# Patient Record
Sex: MALE | Race: OTHER | Employment: FULL TIME
[De-identification: names, ages, dates, MRNs, and addresses within clinical notes are randomized per-mention and may not be internally consistent; named-entity substitution may affect disease eponyms.]

---

## 2017-01-20 ENCOUNTER — TELEPHONE (OUTPATIENT)
Facility: CLINIC | Age: 22
End: 2017-01-20

## 2017-01-20 RX ORDER — AMOXICILLIN 500 MG/1
500 CAPSULE ORAL 3 TIMES DAILY
Qty: 30 CAPSULE | Refills: 0 | Status: SHIPPED | OUTPATIENT
Start: 2017-01-20 | End: 2017-01-30

## 2017-08-12 ENCOUNTER — HOSPITAL ENCOUNTER (OUTPATIENT)
Age: 22
Discharge: HOME | End: 2017-08-12
Payer: COMMERCIAL

## 2017-08-12 DIAGNOSIS — Z72.51 UNPROTECTED SEX: ICD-10-CM

## 2017-08-12 DIAGNOSIS — Z11.3 SCREEN FOR STD (SEXUALLY TRANSMITTED DISEASE): ICD-10-CM

## 2017-08-12 LAB
HIV AG/AB: NONREACTIVE
T. PALLIDUM, IGG: NONREACTIVE

## 2017-08-12 PROCEDURE — 87389 HIV-1 AG W/HIV-1&-2 AB AG IA: CPT

## 2017-08-12 PROCEDURE — 86696 HERPES SIMPLEX TYPE 2 TEST: CPT

## 2017-08-12 PROCEDURE — 86780 TREPONEMA PALLIDUM: CPT

## 2017-08-12 PROCEDURE — 87491 CHLMYD TRACH DNA AMP PROBE: CPT

## 2017-08-12 PROCEDURE — 36415 COLL VENOUS BLD VENIPUNCTURE: CPT

## 2017-08-12 PROCEDURE — 87086 URINE CULTURE/COLONY COUNT: CPT

## 2017-08-12 PROCEDURE — 87591 N.GONORRHOEAE DNA AMP PROB: CPT

## 2017-08-12 PROCEDURE — 86695 HERPES SIMPLEX TYPE 1 TEST: CPT

## 2017-08-13 LAB
CULTURE: NO GROWTH
CULTURE: NORMAL
Lab: NORMAL
SPECIMEN DESCRIPTION: NORMAL
SPECIMEN DESCRIPTION: NORMAL
STATUS: NORMAL

## 2017-08-14 LAB
C. TRACHOMATIS DNA ,URINE: NEGATIVE
HERPES SIMPLEX VIRUS 1 IGG: 0.93
HERPES SIMPLEX VIRUS 2 IGG: 0.03
N. GONORRHOEAE DNA, URINE: NEGATIVE

## 2017-11-29 ENCOUNTER — HOSPITAL ENCOUNTER (OUTPATIENT)
Age: 22
Setting detail: SPECIMEN
Discharge: HOME | End: 2017-11-29
Payer: COMMERCIAL

## 2017-11-29 DIAGNOSIS — Z20.2 POSSIBLE EXPOSURE TO STD: ICD-10-CM

## 2017-11-29 DIAGNOSIS — N50.89 GENITAL LESION, MALE: ICD-10-CM

## 2017-11-30 LAB
C. TRACHOMATIS DNA ,URINE: NEGATIVE
HERPES SIMPLEX VIRUS 1 IGG: 0.69
HERPES SIMPLEX VIRUS 2 IGG: 0.1
HERPES TYPE 1/2 IGM COMBINED: 0.68
HIV AG/AB: NONREACTIVE
N. GONORRHOEAE DNA, URINE: NEGATIVE
T. PALLIDUM, IGG: NONREACTIVE

## 2018-05-08 ENCOUNTER — HOSPITAL ENCOUNTER (OUTPATIENT)
Age: 23
Setting detail: SPECIMEN
Discharge: HOME | End: 2018-05-08
Payer: COMMERCIAL

## 2018-05-08 DIAGNOSIS — Z20.2 EXPOSURE TO SEXUALLY TRANSMITTED DISEASE (STD): ICD-10-CM

## 2018-05-08 LAB
HIV AG/AB: NONREACTIVE
T. PALLIDUM, IGG: NONREACTIVE

## 2018-05-10 LAB — HERPES TYPE 1/2 IGM COMBINED: 1.24

## 2019-05-24 PROBLEM — J45.20 MILD INTERMITTENT ASTHMA: Status: ACTIVE | Noted: 2019-05-24

## 2019-07-08 ENCOUNTER — HOSPITAL ENCOUNTER (OUTPATIENT)
Dept: PHYSICAL THERAPY | Age: 24
Setting detail: THERAPIES SERIES
Discharge: HOME | End: 2019-07-08
Payer: COMMERCIAL

## 2019-07-08 PROCEDURE — 97161 PT EVAL LOW COMPLEX 20 MIN: CPT

## 2019-07-08 PROCEDURE — 97140 MANUAL THERAPY 1/> REGIONS: CPT

## 2019-07-08 PROCEDURE — 97110 THERAPEUTIC EXERCISES: CPT

## 2019-07-08 NOTE — CONSULTS
worse 8/10   Pain at best 0/10   Description of pain Constant, sharp, shooting, aching, numb   Altered Sensation Numbness into R hand   What makes it worse Sleeping, physical activity   What makes it better Nothing   Symptom progression Gotten better   Sleep Affected           Objective:      STRENGTH  STRENGTH    Left Right  Left Right   C5 Shld Abd   L1-2 Hip Flex     Shld Flexion   Hip Abd     Shld IR   L3-4 Knee Ext     Shld ER   L4 Ankle DF     C6 Elb Flex   L5 EHL     C7 Elb Ext   S1 Plant. Flex     C8 EPL   Abdominals     T1 Fing Abd   Erector Spinae        PPT from 90 to=                                                                 Cervical ROM Left Right   Flexion Limited 50%     Extension      Rotation      Sidebend      Retraction      Pt with pain with flexion and extension      TESTS (+/-) LEFT RIGHT Not Tested   SLR supine   []   Hamstring (SLR)   []   SKTC   []   DKTC   []   Slump/Dural   []   SI JT   []   VIOLETTE   []   Joint Mobility   []   Cerv. Comp   []   Cerv. Distraction   []   Cerv.  Alar/Transverse   []   Vertebral Artery   []   Adsons   []   Charlet Stai - - []     OBSERVATION No Deficit Deficit Not Tested Comments   Posture       Forward Head [] [x] []    Rounded Shoulders [] [x] []    Kyphosis [] [] []    Lordosis [] [] []    Lateral Shift [] [] []    Scoliosis [] [] []    Iliac Crest [] [] []    PSIS [] [] []    ASIS [] [] []    Genu Valgus [] [] []    Genu Varus [] [] []    Genu Recurvatum [] [] []    Pronation [] [] []    Supination [] [] []    Leg Length Discrp [] [] []    Slumped sitting [] [x] []    Palpation [] [x] [] Pt with tightness in R UT but no tenderness to palpation   Sensation [x] [] [] Pt reports numbness in R hand, but no disturbance noted during testing   Edema [x] [] []    Neurological [x] [] []      Somatic Dysfunctions Normal Deficit Details   Cervical   [] [x] FRSR- C4-6   Thoracic   [] [x] FRSR T3-6   Rib   [] [x] R 1st rib elevated  R posterior ribs 3-6   Pelvis   [] []    Lumbar [] []    SI   [] []      Flexibility Normal Left tight Right tight   UT []    []    [x]      Scalenes []    []    []      L. Scap []    []    [x]      Pec Minor []    []    []          FUNCTION Normal Difficult Unable   Sitting [] [] []   Standing [] [] []   Ambulation [] [] []   Groom/Dress [] [] []   Lift/Carry [] [] []   Stairs [] [] []   Bending [] [] []   OH reach [] [] []   Sit to Stand [] [] []         Assessment:     STG: (to be met in 10 treatments)  1. ? Pain: Pt to decrease pain levels to 3/10 with all activities to ease pt's ability to complete ADLs  2. ? ROM: Increase flexibility of R UT to help decrease pain and improve pain free cervical motion   3. ? Strength: Pt will improve scapular stabilizer strength to help improve pt's posture and alingment  4. Independent with Home Exercise Programs    LTG: (to be met in 20 treatments)  1. Pt will score 0% impaired on NDI to allow pt to complete work tasks and return to [facility] without restrictions. 2. Decrease pain levels to 0/10 to allow pt to complete work tasks with no restrictions. Patient goals: \"to be painless\"    Rehab Potential:  [x] Good  [] Fair  [] Poor   Suggested Professional Referral:  [x] No  [] Yes:  Barriers to Goal Achievement[de-identified]  [x] No  [] Yes:  Domestic Concerns:  [x] No  [] Yes:    Pt. Education:  [x] Plans/Goals, Risks/Benefits discussed  [x] Home exercise program  Method of Education: [x] Verbal  [x] Demo  [x] Written  Comprehension of Education:  [x] Verbalizes understanding. [x] Demonstrates understanding. [x] Needs Review. [] Demonstrates/verbalizes understanding of HEP/Ed previously given.     Treatment Plan:  [x] Therapeutic Exercise   [] Electrical Stimulation  [x] Manual Therapy     [] Lumbar/Cervical Traction  [x] Neuromuscular Re-education [x] Cold/hotpack [] Iontophoresis: 4 mg/mL  [x] Instruction in HEP             Dexamethasone Sodium  [] Gait Training           Phosphate 40-80 mAmin  [] Vasocompression: Yousif    [] Other:    []  Medication allergies reviewed for use of    Dexamethasone Sodium Phosphate 4mg/ml     with iontophoresis treatments. Pt is not allergic. Frequency:  2 x/week for 20 visits      Todays Treatment:    Exercises:  Exercise  Neck Reps/ Time Weight/ Level Comments   Levator S 3x30\"     UT S 3x30\"     Scap Squeezes 20x 5\"           Prone:      Scap pinches      I's      Y's      T's      Ext      Rows                                          Other: Myofascial release to R UT [facility]       Somatic Dysfunctions Normal Deficit Details   Cervical   [] [x] FRSR- C4-6- MET   Thoracic   [] [x] FRSR T3-6- MOB   Rib   [] [x] R 1st rib elevated- MET  R posterior ribs 3-6- MOB   Pelvis   [] []    Lumbar [] []    SI   [] []          Specific Instructions for next treatment: Continue tx per POC, Administer NDI    Evaluation Complexity:  History (Personal factors, comorbidities) [x] 0 [] 1-2 [] 3+   Exam (limitations, restrictions) [x] 1-2 [] 3 [] 4+   Clinical presentation (progression) [x] Stable [] Evolving  [] Unstable   Decision Making [x] Low [] Moderate [] High    [x] Low Complexity [] Moderate Complexity [] High Complexity       Treatment Charges: Mins Units   [x] Evaluation       [x]  Low       []  Moderate       []  High 30 1   []  Modalities     [x]  Ther Exercise 10 1   [x]  Manual Therapy 20 1   []  Ther Activities     []  Aquatics     []  Vasocompression     []  Other       TOTAL TREATMENT TIME: 60    Time in: 8905      Time out: [address].    Electronically signed by: Alberta Briseno PT    Physician Signature:________________________________Date:__________________  By signing above or cosigning this note, I have reviewed this plan of care and certify a need for medically necessary rehabilitation services.      *PLEASE SIGN ABOVE AND FAX BACK ALL PAGES*

## 2019-07-11 ENCOUNTER — HOSPITAL ENCOUNTER (OUTPATIENT)
Dept: PHYSICAL THERAPY | Age: 24
Setting detail: THERAPIES SERIES
Discharge: HOME | End: 2019-07-11
Payer: COMMERCIAL

## 2019-07-11 PROCEDURE — 97110 THERAPEUTIC EXERCISES: CPT

## 2019-07-11 PROCEDURE — 97140 MANUAL THERAPY 1/> REGIONS: CPT

## 2019-07-11 NOTE — FLOWSHEET NOTE
Other     Total Treatment time 45 3       Assessment: [x] Progressing toward goals. [] No change. [x] Other: Pt states that he has relief from nec pain following manual interventions on this date, giving him better pain-free ROM. Added scapular stabilizer exercises on this date with good tolerance from pt. Pt required verbal, tactile, and demonstrations to properly complete prone exercises, often trying to compensate with UTs for completion. STG: (to be met in 10 treatments)  1. ? Pain: Pt to decrease pain levels to 3/10 with all activities to ease pt's ability to complete ADLs  2. ? ROM: Increase flexibility of R UT to help decrease pain and improve pain free cervical motion   3. ? Strength: Pt will improve scapular stabilizer strength to help improve pt's posture and alingment  4. Independent with Home Exercise Programs     LTG: (to be met in 20 treatments)  1. Pt will score 0% impaired on NDI to allow pt to complete work tasks and return to [facility] without restrictions. 2. Decrease pain levels to 0/10 to allow pt to complete work tasks with no restrictions.     Patient goals: \"to be painless\"        Pt. Education:  [x] Yes  [] No  [x] Reviewed Prior HEP/Ed  Method of Education: [x] Verbal  [x] Demo  [] Written  Comprehension of Education:  [x] Verbalizes understanding. [x] Demonstrates understanding. [x] Needs review. [] Demonstrates/verbalizes HEP/Ed previously given. Plan: [x] Continue per plan of care. [] Other:      Time In: 5375            Time Out: 1390    Electronically signed by:   Elvira Bhatia, PT

## 2019-07-15 ENCOUNTER — HOSPITAL ENCOUNTER (OUTPATIENT)
Dept: PHYSICAL THERAPY | Age: 24
Setting detail: THERAPIES SERIES
Discharge: HOME | End: 2019-07-15
Payer: COMMERCIAL

## 2019-07-15 PROCEDURE — 97140 MANUAL THERAPY 1/> REGIONS: CPT

## 2019-07-15 PROCEDURE — 97110 THERAPEUTIC EXERCISES: CPT

## 2019-07-15 NOTE — FLOWSHEET NOTE
Added prone scapular stabilization exercises and increased reps on prone exercises done last session, Added t-band exercises with no pain, but pt required verbal and tactile cues for proper technique. No increased pain following session. STG: (to be met in 10 treatments)  1. ? Pain: Pt to decrease pain levels to 3/10 with all activities to ease pt's ability to complete ADLs  2. ? ROM: Increase flexibility of R UT to help decrease pain and improve pain free cervical motion   3. ? Strength: Pt will improve scapular stabilizer strength to help improve pt's posture and alingment  4. Independent with Home Exercise Programs     LTG: (to be met in 20 treatments)  1. Pt will score 0% impaired on NDI to allow pt to complete work tasks and return to [facility] without restrictions. 2. Decrease pain levels to 0/10 to allow pt to complete work tasks with no restrictions.     Patient goals: \"to be painless\"        Pt. Education:  [x] Yes  [] No  [x] Reviewed Prior HEP/Ed  Method of Education: [x] Verbal  [x] Demo  [] Written  Comprehension of Education:  [x] Verbalizes understanding. [x] Demonstrates understanding. [x] Needs review. [] Demonstrates/verbalizes HEP/Ed previously given. Plan: [x] Continue per plan of care. [] Other:      Time In: 1600            Time Out: 5254    Electronically signed by:   Kodi Hernández, PT

## 2019-07-17 ENCOUNTER — APPOINTMENT (OUTPATIENT)
Dept: PHYSICAL THERAPY | Age: 24
End: 2019-07-17
Payer: COMMERCIAL

## 2019-07-18 ENCOUNTER — HOSPITAL ENCOUNTER (OUTPATIENT)
Dept: PHYSICAL THERAPY | Age: 24
Setting detail: THERAPIES SERIES
Discharge: HOME | End: 2019-07-18
Payer: COMMERCIAL

## 2019-07-18 PROCEDURE — 97110 THERAPEUTIC EXERCISES: CPT

## 2019-07-18 PROCEDURE — 97140 MANUAL THERAPY 1/> REGIONS: CPT

## 2019-07-22 ENCOUNTER — HOSPITAL ENCOUNTER (OUTPATIENT)
Dept: PHYSICAL THERAPY | Age: 24
Setting detail: THERAPIES SERIES
Discharge: HOME | End: 2019-07-22
Payer: COMMERCIAL

## 2019-07-22 PROCEDURE — 97140 MANUAL THERAPY 1/> REGIONS: CPT

## 2019-07-22 PROCEDURE — 97110 THERAPEUTIC EXERCISES: CPT

## 2019-07-24 ENCOUNTER — HOSPITAL ENCOUNTER (OUTPATIENT)
Dept: PHYSICAL THERAPY | Age: 24
Setting detail: THERAPIES SERIES
Discharge: HOME | End: 2019-07-24
Payer: COMMERCIAL

## 2019-08-01 ENCOUNTER — HOSPITAL ENCOUNTER (OUTPATIENT)
Dept: PHYSICAL THERAPY | Age: 24
Setting detail: THERAPIES SERIES
Discharge: HOME | End: 2019-08-01
Payer: COMMERCIAL

## 2019-08-01 PROCEDURE — 97110 THERAPEUTIC EXERCISES: CPT

## 2019-08-14 ENCOUNTER — HOSPITAL ENCOUNTER (OUTPATIENT)
Dept: PHYSICAL THERAPY | Age: 24
Setting detail: THERAPIES SERIES
Discharge: HOME | End: 2019-08-14
Payer: COMMERCIAL

## 2019-08-14 PROCEDURE — 97140 MANUAL THERAPY 1/> REGIONS: CPT

## 2019-08-14 PROCEDURE — 97110 THERAPEUTIC EXERCISES: CPT

## 2019-08-14 NOTE — FLOWSHEET NOTE
[]       [address]  Outpatient Physical Therapy              [address]              Phone: [phone]              Fax: [phone]         Physical Therapy Daily Treatment Note    Date:  2019  Patient Name:  Yani Dukes    :  1995  MRN: [id number]  Physician: LINUS Barbosa - DALIA                               Insurance: [address] [place] [address]   Medical Diagnosis: Neck pain, Upper back pain     Rehab Codes: M54.2, M54.9  Onset Date: 19               Next 's appt.: N/A  Visit# / total visits:   Cancels/No Shows: 10    Subjective:    Pain:  [] Yes  [x] No Location: Neck  Pain Rating: (0-10 scale) 7/10  Pain altered Tx:  [x] No  [] Yes  Action:  Comments:Pt reports to PT stating that he has been in increased pain these past few days which started after working out last week. Pt reports that he wasn't in pain right away, but after he was done he felt the pain gradually increase. Pt wakes up with pain in his neck, but once he gets moving around at work, he states that it gets better. Pt also reports that he has numbness in his R arm that gets better as he is working and gets loser. Objective:  Modalities:   Precautions:  Exercises:  Exercise  Neck Reps/ Time Weight/ Level Comments   Levator S 3x30\"       UT S 3x30\"       Scap Squeezes 20x 5\"        Scalene S  3x30\"       Prone:         Scap pinches  2x20, 5\"       I's  2x10x 2#      Y's  2x10x  2#     T's  2x10x  2#     Ext  2x10x  2#     Rows  2x10x  2#                T-band:          Rows 3x15  Purple band, red handles       Hor.  ABD  3x15  Yellow T-band      ER  3x15 Yellow T-band               Shoulder abd/flex 2x15 3#                Other: Myofascial release to R UT/ levator scap/ Scalenes      Somatic Dysfunctions Normal Deficit Details   Cervical   [] []    Thoracic   [] [x] FRSR T3-5, T7- MOB   Rib   [] [x] R Posterior ribs 3-5- MOB   Pelvis   [] []    Lumbar [] []    SI   [] []          Specific

## 2019-08-22 ENCOUNTER — HOSPITAL ENCOUNTER (OUTPATIENT)
Dept: PHYSICAL THERAPY | Age: 24
Setting detail: THERAPIES SERIES
Discharge: HOME | End: 2019-08-22
Payer: COMMERCIAL

## 2019-08-22 PROCEDURE — 97140 MANUAL THERAPY 1/> REGIONS: CPT

## 2019-08-22 PROCEDURE — 97110 THERAPEUTIC EXERCISES: CPT

## 2019-08-22 NOTE — FLOWSHEET NOTE
Specific Instructions for next treatment: Assess need for manual therapy    Treatment Charges: Mins Units   []  Modalities     [x]  Ther Exercise 15 1   [x]  Manual Therapy 22 2   []  Ther Activities     []  Aquatics     []  Vasocompression     []  Other     Total Treatment time 42 3       Assessment: [x] Progressing toward goals. [] No change. [x] Other: Pt continues to report 0/10 pain following manual therapy today, stating that he is able to move his head in all directions with no pain felt. Continued with stretches following manual to work towards improving patient's ROM. Initiated pec wall S and doorway lat S during today's session. STG: (to be met in 10 treatments)  1. ? Pain: Pt to decrease pain levels to 3/10 with all activities to ease pt's ability to complete ADLs  2. ? ROM: Increase flexibility of R UT to help decrease pain and improve pain free cervical motion   3. ? Strength: Pt will improve scapular stabilizer strength to help improve pt's posture and alingment  4. Independent with Home Exercise Programs     LTG: (to be met in 20 treatments)  1. Pt will score 0% impaired on NDI to allow pt to complete work tasks and return to [facility] without restrictions. 2. Decrease pain levels to 0/10 to allow pt to complete work tasks with no restrictions.     Patient goals: \"to be painless\"        Pt. Education:  [x] Yes  [] No  [x] Reviewed Prior HEP/Ed  Method of Education: [x] Verbal  [] Demo  [] Written  Comprehension of Education:  [x] Verbalizes understanding. [x] Demonstrates understanding. [x] Needs review. [] Demonstrates/verbalizes HEP/Ed previously given. Plan: [x] Continue per plan of care. [] Other:       Time In: 1815            Time Out: 1859    Electronically signed by:   Quinn Burton, PT

## 2019-08-28 ENCOUNTER — HOSPITAL ENCOUNTER (OUTPATIENT)
Dept: MRI IMAGING | Facility: CLINIC | Age: 24
Discharge: HOME | End: 2019-08-30
Payer: COMMERCIAL

## 2019-08-28 ENCOUNTER — HOSPITAL ENCOUNTER (OUTPATIENT)
Dept: GENERAL RADIOLOGY | Facility: CLINIC | Age: 24
Discharge: HOME | End: 2019-08-30
Payer: COMMERCIAL

## 2019-08-28 DIAGNOSIS — M54.2 CERVICAL PAIN (NECK): ICD-10-CM

## 2019-08-28 DIAGNOSIS — T15.90XA FOREIGN BODY IN EYE, UNSPECIFIED LATERALITY, INITIAL ENCOUNTER: ICD-10-CM

## 2019-08-28 DIAGNOSIS — M79.2 RADICULAR PAIN IN RIGHT ARM: ICD-10-CM

## 2019-08-28 DIAGNOSIS — S46.811A STRAIN OF RIGHT TRAPEZIUS MUSCLE, INITIAL ENCOUNTER: ICD-10-CM

## 2019-08-28 PROCEDURE — 72141 MRI NECK SPINE W/O DYE: CPT

## 2019-08-28 PROCEDURE — 70030 X-RAY EYE FOR FOREIGN BODY: CPT

## 2019-10-02 ENCOUNTER — HOSPITAL ENCOUNTER (OUTPATIENT)
Dept: PHYSICAL THERAPY | Age: 24
Setting detail: THERAPIES SERIES
Discharge: HOME | End: 2019-10-02
Payer: COMMERCIAL

## 2019-10-02 PROCEDURE — 97161 PT EVAL LOW COMPLEX 20 MIN: CPT

## 2019-10-09 ENCOUNTER — TELEPHONE (OUTPATIENT)
Dept: NEUROSURGERY | Age: 24
End: 2019-10-09

## 2019-10-09 ENCOUNTER — HOSPITAL ENCOUNTER (OUTPATIENT)
Dept: PHYSICAL THERAPY | Age: 24
Setting detail: THERAPIES SERIES
Discharge: HOME | End: 2019-10-09
Payer: COMMERCIAL

## 2019-10-09 PROCEDURE — 97113 AQUATIC THERAPY/EXERCISES: CPT

## 2019-10-10 ENCOUNTER — HOSPITAL ENCOUNTER (OUTPATIENT)
Dept: PHYSICAL THERAPY | Age: 24
Setting detail: THERAPIES SERIES
Discharge: HOME | End: 2019-10-10
Payer: COMMERCIAL

## 2019-10-10 PROCEDURE — 97113 AQUATIC THERAPY/EXERCISES: CPT

## 2019-10-14 ENCOUNTER — HOSPITAL ENCOUNTER (OUTPATIENT)
Dept: PHYSICAL THERAPY | Age: 24
Setting detail: THERAPIES SERIES
Discharge: HOME | End: 2019-10-14
Payer: COMMERCIAL

## 2019-10-14 PROCEDURE — 97110 THERAPEUTIC EXERCISES: CPT

## 2019-10-16 ENCOUNTER — HOSPITAL ENCOUNTER (OUTPATIENT)
Dept: PHYSICAL THERAPY | Age: 24
Setting detail: THERAPIES SERIES
Discharge: HOME | End: 2019-10-16
Payer: COMMERCIAL

## 2019-10-16 PROCEDURE — 97113 AQUATIC THERAPY/EXERCISES: CPT

## 2019-10-22 ENCOUNTER — HOSPITAL ENCOUNTER (OUTPATIENT)
Dept: PHYSICAL THERAPY | Age: 24
Setting detail: THERAPIES SERIES
Discharge: HOME | End: 2019-10-22
Payer: COMMERCIAL

## 2019-10-22 PROCEDURE — 97113 AQUATIC THERAPY/EXERCISES: CPT

## 2019-10-25 ENCOUNTER — HOSPITAL ENCOUNTER (OUTPATIENT)
Dept: PHYSICAL THERAPY | Age: 24
Setting detail: THERAPIES SERIES
Discharge: HOME | End: 2019-10-25
Payer: COMMERCIAL

## 2019-10-25 PROCEDURE — 97110 THERAPEUTIC EXERCISES: CPT

## 2019-10-28 ENCOUNTER — HOSPITAL ENCOUNTER (OUTPATIENT)
Dept: PHYSICAL THERAPY | Age: 24
Setting detail: THERAPIES SERIES
Discharge: HOME | End: 2019-10-28
Payer: COMMERCIAL

## 2019-10-28 PROCEDURE — 97110 THERAPEUTIC EXERCISES: CPT

## 2019-10-31 ENCOUNTER — HOSPITAL ENCOUNTER (OUTPATIENT)
Dept: PHYSICAL THERAPY | Age: 24
Setting detail: THERAPIES SERIES
Discharge: HOME | End: 2019-10-31
Payer: COMMERCIAL

## 2019-11-04 ENCOUNTER — HOSPITAL ENCOUNTER (OUTPATIENT)
Dept: PHYSICAL THERAPY | Age: 24
Setting detail: THERAPIES SERIES
Discharge: HOME | End: 2019-11-04
Payer: COMMERCIAL

## 2019-11-04 PROCEDURE — 97110 THERAPEUTIC EXERCISES: CPT

## 2019-11-06 ENCOUNTER — HOSPITAL ENCOUNTER (OUTPATIENT)
Dept: PHYSICAL THERAPY | Age: 24
Setting detail: THERAPIES SERIES
Discharge: HOME | End: 2019-11-06
Payer: COMMERCIAL

## 2019-11-06 PROCEDURE — 97110 THERAPEUTIC EXERCISES: CPT

## 2019-11-13 ENCOUNTER — HOSPITAL ENCOUNTER (OUTPATIENT)
Dept: PHYSICAL THERAPY | Age: 24
Setting detail: THERAPIES SERIES
Discharge: HOME | End: 2019-11-13
Payer: COMMERCIAL

## 2019-11-13 PROCEDURE — 97110 THERAPEUTIC EXERCISES: CPT

## 2019-11-13 PROCEDURE — 97116 GAIT TRAINING THERAPY: CPT

## 2019-11-20 ENCOUNTER — HOSPITAL ENCOUNTER (OUTPATIENT)
Dept: PHYSICAL THERAPY | Age: 24
Setting detail: THERAPIES SERIES
End: 2019-11-20
Payer: COMMERCIAL

## 2020-07-15 ENCOUNTER — HOSPITAL ENCOUNTER (OUTPATIENT)
Dept: SLEEP CENTER | Age: 25
Discharge: HOME | End: 2020-07-17
Payer: COMMERCIAL

## 2020-07-15 PROCEDURE — 95810 POLYSOM 6/> YRS 4/> PARAM: CPT

## 2020-07-16 VITALS
BODY MASS INDEX: 28.88 KG/M2 | WEIGHT: 184 LBS | HEART RATE: 73 BPM | TEMPERATURE: 98.2 F | OXYGEN SATURATION: 95 % | SYSTOLIC BLOOD PRESSURE: 132 MMHG | DIASTOLIC BLOOD PRESSURE: 74 MMHG | HEIGHT: 67 IN | RESPIRATION RATE: 18 BRPM

## 2020-07-16 ASSESSMENT — SLEEP AND FATIGUE QUESTIONNAIRES
HOW LIKELY ARE YOU TO NOD OFF OR FALL ASLEEP WHILE SITTING AND TALKING TO SOMEONE: 1
ESS TOTAL SCORE: 11
HOW LIKELY ARE YOU TO NOD OFF OR FALL ASLEEP WHILE SITTING AND READING: 2
NECK CIRCUMFERENCE (INCHES): 40
HOW LIKELY ARE YOU TO NOD OFF OR FALL ASLEEP IN A CAR, WHILE STOPPED FOR A FEW MINUTES IN TRAFFIC: 1
HOW LIKELY ARE YOU TO NOD OFF OR FALL ASLEEP WHILE WATCHING TV: 2
HOW LIKELY ARE YOU TO NOD OFF OR FALL ASLEEP WHILE SITTING QUIETLY AFTER LUNCH WITHOUT ALCOHOL: 1
HOW LIKELY ARE YOU TO NOD OFF OR FALL ASLEEP WHILE SITTING INACTIVE IN A PUBLIC PLACE: 1
HOW LIKELY ARE YOU TO NOD OFF OR FALL ASLEEP WHEN YOU ARE A PASSENGER IN A CAR FOR AN HOUR WITHOUT A BREAK: 2
HOW LIKELY ARE YOU TO NOD OFF OR FALL ASLEEP WHILE LYING DOWN TO REST IN THE AFTERNOON WHEN CIRCUMSTANCES PERMIT: 1

## 2020-07-16 NOTE — PAYOR INFORMATION
Verified Demographics with Patient? No   If no why? Already verified  [employer] [facility] Completed? No    If not why? Already completed  Verified Insurance through: Already verified  [employer] Completed?  Not required  Auth Verification #:[id number]  Liability Due: $0   Discount Offered: n/a%       Previous Balance: $ 0   Discount Offered: n/a%  Site Collect Status: no liability  Patient Response: no liability  Financial Aid Offered? n/a  Cards Scanned: yes

## 2020-08-18 ENCOUNTER — HOSPITAL ENCOUNTER (OUTPATIENT)
Age: 25
Setting detail: SPECIMEN
Discharge: HOME | End: 2020-08-18
Payer: COMMERCIAL

## 2020-08-18 ENCOUNTER — NURSE ONLY (OUTPATIENT)
Dept: PRIMARY CARE CLINIC | Age: 25
End: 2020-08-18

## 2020-08-22 LAB — SARS-COV-2, NAA: NOT DETECTED

## 2020-08-25 ENCOUNTER — HOSPITAL ENCOUNTER (OUTPATIENT)
Age: 25
Setting detail: SPECIMEN
Discharge: HOME | End: 2020-08-25
Payer: COMMERCIAL

## 2020-08-25 LAB
ABSOLUTE EOS #: 0.05 K/UL (ref 0–0.44)
ABSOLUTE IMMATURE GRANULOCYTE: <0.03 K/UL (ref 0–0.3)
ABSOLUTE LYMPH #: 2.27 K/UL (ref 1.1–3.7)
ABSOLUTE MONO #: 0.68 K/UL (ref 0.1–1.2)
ALBUMIN SERPL-MCNC: 4.7 G/DL (ref 3.5–5.2)
ALBUMIN/GLOBULIN RATIO: 1.5 (ref 1–2.5)
ALP BLD-CCNC: 62 U/L (ref 40–129)
ALT SERPL-CCNC: 26 U/L (ref 5–41)
AMYLASE: 55 U/L (ref 28–100)
ANION GAP SERPL CALCULATED.3IONS-SCNC: 15 MMOL/L (ref 9–17)
AST SERPL-CCNC: 18 U/L
BASOPHILS # BLD: 1 % (ref 0–2)
BASOPHILS ABSOLUTE: 0.03 K/UL (ref 0–0.2)
BILIRUB SERPL-MCNC: 1.1 MG/DL (ref 0.3–1.2)
BUN BLDV-MCNC: 22 MG/DL (ref 6–20)
BUN/CREAT BLD: ABNORMAL (ref 9–20)
CALCIUM SERPL-MCNC: 9.8 MG/DL (ref 8.6–10.4)
CHLORIDE BLD-SCNC: 98 MMOL/L (ref 98–107)
CO2: 23 MMOL/L (ref 20–31)
CREAT SERPL-MCNC: 0.88 MG/DL (ref 0.7–1.2)
DIFFERENTIAL TYPE: ABNORMAL
EOSINOPHILS RELATIVE PERCENT: 1 % (ref 1–4)
GFR AFRICAN AMERICAN: >60 ML/MIN
GFR NON-AFRICAN AMERICAN: >60 ML/MIN
GFR SERPL CREATININE-BSD FRML MDRD: ABNORMAL ML/MIN/{1.73_M2}
GFR SERPL CREATININE-BSD FRML MDRD: ABNORMAL ML/MIN/{1.73_M2}
GLUCOSE BLD-MCNC: 86 MG/DL (ref 70–99)
HCT VFR BLD CALC: 50.4 % (ref 40.7–50.3)
HEMOGLOBIN: 16.2 G/DL (ref 13–17)
IMMATURE GRANULOCYTES: 0 %
LIPASE: 17 U/L (ref 13–60)
LYMPHOCYTES # BLD: 40 % (ref 24–43)
MCH RBC QN AUTO: 28 PG (ref 25.2–33.5)
MCHC RBC AUTO-ENTMCNC: 32.1 G/DL (ref 28.4–34.8)
MCV RBC AUTO: 87 FL (ref 82.6–102.9)
MONOCYTES # BLD: 12 % (ref 3–12)
NRBC AUTOMATED: 0 PER 100 WBC
PDW BLD-RTO: 12.4 % (ref 11.8–14.4)
PLATELET # BLD: 306 K/UL (ref 138–453)
PLATELET ESTIMATE: ABNORMAL
PMV BLD AUTO: 10.5 FL (ref 8.1–13.5)
POTASSIUM SERPL-SCNC: 4.7 MMOL/L (ref 3.7–5.3)
RBC # BLD: 5.79 M/UL (ref 4.21–5.77)
RBC # BLD: ABNORMAL 10*6/UL
SEG NEUTROPHILS: 46 % (ref 36–65)
SEGMENTED NEUTROPHILS ABSOLUTE COUNT: 2.63 K/UL (ref 1.5–8.1)
SODIUM BLD-SCNC: 136 MMOL/L (ref 135–144)
TOTAL PROTEIN: 7.9 G/DL (ref 6.4–8.3)
WBC # BLD: 5.7 K/UL (ref 3.5–11.3)
WBC # BLD: ABNORMAL 10*3/UL

## 2020-08-26 LAB
EBV EARLY ANTIGEN IGG: 42 U/ML
EBV INTERPRETATION: ABNORMAL
EBV NUCLEAR AG AB: 220 U/ML
EPSTEIN-BARR VCA IGG: 733 U/ML
EPSTEIN-BARR VCA IGM: 18 U/ML
ESTIMATED AVERAGE GLUCOSE: 105 MG/DL
HBA1C MFR BLD: 5.3 % (ref 4–6)
MONONUCLEOSIS SCREEN: NEGATIVE

## 2020-10-09 ENCOUNTER — HOSPITAL ENCOUNTER (OUTPATIENT)
Dept: PREADMISSION TESTING | Age: 25
Setting detail: SPECIMEN
Discharge: HOME | End: 2020-10-13
Payer: COMMERCIAL

## 2020-10-09 PROCEDURE — U0003 INFECTIOUS AGENT DETECTION BY NUCLEIC ACID (DNA OR RNA); SEVERE ACUTE RESPIRATORY SYNDROME CORONAVIRUS 2 (SARS-COV-2) (CORONAVIRUS DISEASE [COVID-19]), AMPLIFIED PROBE TECHNIQUE, MAKING USE OF HIGH THROUGHPUT TECHNOLOGIES AS DESCRIBED BY CMS-2020-01-R: HCPCS

## 2020-10-11 LAB — SARS-COV-2, NAA: NOT DETECTED

## 2020-10-13 ENCOUNTER — HOSPITAL ENCOUNTER (OUTPATIENT)
Dept: SLEEP CENTER | Age: 25
Discharge: HOME | End: 2020-10-15
Payer: COMMERCIAL

## 2020-10-13 PROCEDURE — 95811 POLYSOM 6/>YRS CPAP 4/> PARM: CPT | Performed by: PSYCHIATRY & NEUROLOGY

## 2020-10-13 PROCEDURE — 95811 POLYSOM 6/>YRS CPAP 4/> PARM: CPT

## 2020-10-14 VITALS
HEIGHT: 67 IN | WEIGHT: 184 LBS | BODY MASS INDEX: 28.88 KG/M2 | RESPIRATION RATE: 18 BRPM | HEART RATE: 73 BPM | TEMPERATURE: 97.7 F | OXYGEN SATURATION: 95 %

## 2020-10-21 LAB
STATUS: NORMAL
STATUS: NORMAL

## 2020-12-30 ENCOUNTER — NURSE TRIAGE (OUTPATIENT)
Dept: OTHER | Facility: CLINIC | Age: 25
End: 2020-12-30

## 2020-12-30 NOTE — TELEPHONE ENCOUNTER
Patient called to report minor residual Covid symptoms and had expected that they would be gone after quarantine period. Patient was educated that these mild episodes can appear sporadically for about 90 days and that he can treat these at home. Patient verbalized understanding. Answer Assessment - Initial Assessment Questions  1. RESPIRATORY STATUS: \"Describe your breathing? \" (e.g., wheezing, shortness of breath, unable to speak, severe coughing)       Chest is feeling tight    2. ONSET: \"When did this breathing problem begin? \"       Yesterday     3. PATTERN \"Does the difficult breathing come and go, or has it been constant since it started? \"       Constant    4. SEVERITY: \"How bad is your breathing? \" (e.g., mild, moderate, severe)     - MILD: No SOB at rest, mild SOB with walking, speaks normally in sentences, can lay down, no retractions, pulse < 100.     - MODERATE: SOB at rest, SOB with minimal exertion and prefers to sit, cannot lie down flat, speaks in phrases, mild retractions, audible wheezing, pulse 100-120.     - SEVERE: Very SOB at rest, speaks in single words, struggling to breathe, sitting hunched forward, retractions, pulse > 120       Mild    5. RECURRENT SYMPTOM: \"Have you had difficulty breathing before? \" If so, ask: \"When was the last time? \" and \"What happened that time? \"       No    6. CARDIAC HISTORY: \"Do you have any history of heart disease? \" (e.g., heart attack, angina, bypass surgery, angioplasty)       No    7. LUNG HISTORY: \"Do you have any history of lung disease? \"  (e.g., pulmonary embolus, asthma, emphysema)      No    8. CAUSE: \"What do you think is causing the breathing problem? \"       Recent Covid diagnosis    9. OTHER SYMPTOMS: \"Do you have any other symptoms? (e.g., dizziness, runny nose, cough, chest pain, fever)      Mild episodes of dizziness, runny nose and cough    10. PREGNANCY: \"Is there any chance you are pregnant? \" \"When was your last menstrual period? \" N/A    11. TRAVEL: \"Have you traveled out of the country in the last month? \" (e.g., travel history, exposures)        no    Protocols used: BREATHING DIFFICULTY-ADULT-OH

## 2024-10-30 ENCOUNTER — HOSPITAL ENCOUNTER (EMERGENCY)
Age: 29
Discharge: HOME | End: 2024-10-30
Attending: EMERGENCY MEDICINE
Payer: COMMERCIAL

## 2024-10-30 VITALS
TEMPERATURE: 98.2 F | WEIGHT: 180 LBS | HEART RATE: 67 BPM | BODY MASS INDEX: 28.25 KG/M2 | SYSTOLIC BLOOD PRESSURE: 141 MMHG | OXYGEN SATURATION: 100 % | HEIGHT: 67 IN | DIASTOLIC BLOOD PRESSURE: 81 MMHG

## 2024-10-30 DIAGNOSIS — T16.2XXA FOREIGN BODY OF LEFT EAR, INITIAL ENCOUNTER: Primary | ICD-10-CM

## 2024-10-30 PROCEDURE — 99283 EMERGENCY DEPT VISIT LOW MDM: CPT

## 2024-10-30 RX ORDER — NEOMYCIN SULFATE, POLYMYXIN B SULFATE, HYDROCORTISONE 3.5; 10000; 1 MG/ML; [USP'U]/ML; MG/ML
4 SOLUTION/ DROPS AURICULAR (OTIC) 3 TIMES DAILY
Qty: 10 ML | Refills: 0 | Status: SHIPPED | OUTPATIENT
Start: 2024-10-30 | End: 2024-11-09

## 2024-10-30 ASSESSMENT — ENCOUNTER SYMPTOMS
NAUSEA: 0
VOMITING: 0
DIARRHEA: 0
ABDOMINAL PAIN: 0
CONSTIPATION: 0
COUGH: 0
SHORTNESS OF BREATH: 0

## 2024-10-30 ASSESSMENT — LIFESTYLE VARIABLES
HOW MANY STANDARD DRINKS CONTAINING ALCOHOL DO YOU HAVE ON A TYPICAL DAY: 3 OR 4
HOW OFTEN DO YOU HAVE A DRINK CONTAINING ALCOHOL: 2-4 TIMES A MONTH

## 2024-10-30 ASSESSMENT — PAIN DESCRIPTION - DESCRIPTORS: DESCRIPTORS: DISCOMFORT

## 2024-10-30 ASSESSMENT — PAIN - FUNCTIONAL ASSESSMENT: PAIN_FUNCTIONAL_ASSESSMENT: 0-10

## 2024-10-30 ASSESSMENT — PAIN SCALES - GENERAL: PAINLEVEL_OUTOF10: 5

## 2024-10-30 NOTE — ED PROVIDER NOTES
eMERGENCY dEPARTMENT eNCOUnter      Pt Name: Duarte Varela  MRN: [id number]  Birthdate 1995  Date of evaluation: 10/30/24      CHIEF COMPLAINT       Chief Complaint   Patient presents with    Foreign Body in Ear     Spark went in ear when welding, now feels like equilibrium is off. Left ear.         HISTORY OF PRESENT ILLNESS    Duarte Varela is a 29 y.o. male who presents complaining of foreign body in ear.  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  Patient states he feels like his equilibrium is off a little bit.  Patient states that he took a camera that he had and was able to see the BB sized metal in there.  Patient bought some jelly type stuff to put in his ear last night and tried to kind of shake it around and did not do anything.  Patient has some pain.      REVIEW OF SYSTEMS       Review of Systems   Constitutional:  Negative for chills and fever.   HENT:  Positive for ear pain.    Respiratory:  Negative for cough and shortness of breath.    Cardiovascular:  Negative for chest pain and palpitations.   Gastrointestinal:  Negative for abdominal pain, constipation, diarrhea, nausea and vomiting.   Neurological:  Negative for dizziness, seizures and headaches.       PAST MEDICAL HISTORY     Past Medical History:   Diagnosis Date    Mild intermittent asthma 5/24/2019       SURGICAL HISTORY       Past Surgical History:   Procedure Laterality Date    CIRCUMCISION      NECK SURGERY      cervical disc replacement       CURRENT MEDICATIONS       Previous Medications    TESTOSTERONE IM    Inject into the muscle       ALLERGIES     has No Known Allergies.    SOCIAL HISTORY      reports that he has never smoked. He has never used smokeless tobacco. He reports that he does not currently use drugs after having used the following drugs: Marijuana (Weed). He reports that he does not drink alcohol.    PHYSICAL EXAM     INITIAL VITALS: BP (!) 141/81   Pulse 67   Temp 98.2 °F (36.8 °C) (Oral)   Ht  completed with a voice recognition program.  Efforts were made to edit thedictations but occasionally words are mis-transcribed.)    oLpez Estrada MD  Attending Emergency Physician                        Lopez Estrada MD  10/30/24 0409

## 2025-06-16 ENCOUNTER — OFFICE VISIT (OUTPATIENT)
Dept: ORTHOPEDIC SURGERY | Age: 30
End: 2025-06-16
Payer: COMMERCIAL

## 2025-06-16 VITALS — HEIGHT: 67 IN | RESPIRATION RATE: 14 BRPM | WEIGHT: 178.9 LBS | BODY MASS INDEX: 28.08 KG/M2

## 2025-06-16 DIAGNOSIS — M25.511 RIGHT SHOULDER PAIN, UNSPECIFIED CHRONICITY: ICD-10-CM

## 2025-06-16 DIAGNOSIS — M89.9 SCAPULAR DYSFUNCTION: Primary | ICD-10-CM

## 2025-06-16 PROCEDURE — 99203 OFFICE O/P NEW LOW 30 MIN: CPT

## 2025-06-25 ENCOUNTER — HOSPITAL ENCOUNTER (OUTPATIENT)
Dept: PHYSICAL THERAPY | Age: 30
Setting detail: THERAPIES SERIES
Discharge: HOME | End: 2025-06-25
Payer: COMMERCIAL

## 2025-06-25 PROCEDURE — 97161 PT EVAL LOW COMPLEX 20 MIN: CPT

## 2025-06-25 NOTE — CONSULTS
Demonstrates/verbalizes understanding of HEP/Ed previously given.    Treatment Plan:  [x] Therapeutic Exercise   92403  [] Iontophoresis: 4 mg/mL Dexamethasone Sodium Phosphate  mAmin  35720   [x] Therapeutic Activity  40262 [] Vasopneumatic cold with compression  24793    [] Gait Training   11732 [] Ultrasound   85200   [x] Neuromuscular Re-education  33543 [] Electrical Stimulation Unattended  57174   [x] Manual Therapy  01116 [] Electrical Stimulation Attended  47522   [x] Instruction in HEP  [] Lumbar/Cervical Traction  88064   [x] Aquatic Therapy   28192 [x] Cold/hotpack    [x] Massage   00370      [] Dry Needling, 1 or 2 muscles  20560   [] Biofeedback, first 15 minutes   90912  [] Biofeedback, additional 15 minutes   90913 [] Dry Needling, 3 or more muscles  20561       Patient Education/home program:   Access Code: [email or web address]  URL: [email or web address]  Date: 06/25/2025  Prepared by: Shandra Hawley    Exercises  - Doorway Rhomboid Stretch  - 1 x daily - 7 x weekly - 2 sets - 10 reps  - Forearm Walks on Wall with Resistance Band  - 1 x daily - 7 x weekly - 2 sets - 10 reps  - Standing Low Trap Setting with Resistance at Wall  - 1 x daily - 7 x weekly - 2 sets - 10 reps  - Prone Shoulder Flexion with Dumbbell  - 1 x daily - 7 x weekly - 2 sets - 10 reps  - Prone Single Arm Shoulder Y with Dumbbell  - 1 x daily - 7 x weekly - 2 sets - 10 reps  - Prone Single Arm Shoulder Horizontal Abduction with Dumbbell  - 1 x daily - 7 x weekly - 2 sets - 10 reps  - Prone Shoulder Extension - Single Arm with Dumbbell  - 1 x daily - 7 x weekly - 2 sets - 10 reps      Specific Instructions for next treatment: rhomboid and latissimus dorsi strengthening, scapular stabilization, continued examination of SICK scapular syndrome       Treatment Charges: Mins Units Time In/Time out    [x] Evaluation       [x]  Low       []  Moderate       []  High 55 1 6:00 - 6:55 PM   []  Modalities      []  Ther Exercise      []

## 2025-07-14 ENCOUNTER — HOSPITAL ENCOUNTER (OUTPATIENT)
Dept: PHYSICAL THERAPY | Age: 30
Setting detail: THERAPIES SERIES
Discharge: HOME | End: 2025-07-14
Payer: COMMERCIAL

## 2025-07-14 PROCEDURE — 97110 THERAPEUTIC EXERCISES: CPT

## 2025-07-14 NOTE — FLOWSHEET NOTE
[] [facility]  Outpatient Rehabilitation &  Therapy  [address]   [address]  P: [phone]  F: [phone] [x] [facility]  Outpatient Rehabilitation &  Therapy  [address]   [address]  P: [phone]  F: [phone]        Physical Therapy Daily Treatment Note    Date:  2025  Patient Name:  Duarte Varela    :  1995  MRN: [id number]  Physician: Jed Starks PA-C                               Insurance: [employer] ( visits remaining, Auth after  visit)  Medical Diagnosis: M89.9 (ICD-10-CM) - Scapular dysfunction     Rehab Codes: M89.9, R53.1  Onset Date: 25               Next 's appt: TBD  Visit# / total visits:      Cancels/No Shows: 0/0    Subjective:    Pain:  [x] Yes  [] No Location: R scapula Pain Rating: (0-10 scale) no numerical/10  Pain altered Tx:  [x] No  [] Yes  Action:  Comments:  Pt arrives without significant pain noted. He notes increased symptoms with work and certain activities in the gym.  Free weight overhead and jiu jitsu if he rolls onto the R side cause increased symptoms. Pt reports that he has been trying to stretch and do some of the exercises given as part of his initial eval and notes some improvement with \"getting things warmed up.\"    Objective:  Modalities:   Precautions: standard  Exercises:  Exercise Reps/ Time Weight/ Level Comments   Foam roller flexion to snow edmundo stretch 10x     Child's pose w/ thread the needle 5x20\" ea     Doorway rhomboid stretch 3x30\"           Prone scapular retraction 20x5\"     Prone scap retraction + ext 20x2\" 2# DBs    Prone T 20x2\" 2# DBs    Prone Y 20x2\"           Forearm walks on wall 15x green    Standing serratus lift 15x green    Serratus push up plus 10x     Standing serratus push 10x Orange power band    Primal pushup 10x5\"     Primal push up + shoulder tap 10x ea     Other:    Specific Instructions for next treatment: rhomboid and

## 2025-07-21 ENCOUNTER — HOSPITAL ENCOUNTER (OUTPATIENT)
Dept: PHYSICAL THERAPY | Age: 30
Setting detail: THERAPIES SERIES
Discharge: HOME | End: 2025-07-21
Payer: COMMERCIAL

## 2025-07-21 PROCEDURE — 97110 THERAPEUTIC EXERCISES: CPT

## 2025-07-21 NOTE — FLOWSHEET NOTE
[] [facility]  Outpatient Rehabilitation &  Therapy  [address]   [address]  P: [phone]  F: [phone] [x] [facility]  Outpatient Rehabilitation &  Therapy  [address]   [address]  P: [phone]  F: [phone]        Physical Therapy Daily Treatment Note    Date:  2025  Patient Name:  Duarte Varela    :  1995  MRN: [id number]  Physician: Jed Starks PA-C                                 Insurance: [employer] ( visits remaining, Auth after  visit)  Medical Diagnosis: M89.9 (ICD-10-CM) - Scapular dysfunction     Rehab Codes: M89.9, R53.1  Onset Date: 25               Next 's appt: TBD  Visit# / total visits: 3/12     Cancels/No Shows: 0/0    Subjective:    Pain:  [x] Yes  [] No Location: R scapula Pain Rating: (0-10 scale) 6/10  Pain altered Tx:  [x] No  [] Yes  Action:  Comments:  Pt arrives increased symptoms of unknown cause this date. He reports he was feeling pretty good and did his exercises a handful of times and went to the gym 2 times since last being seen.     Objective:  Modalities:   Precautions: standard  Exercises:  Exercise Reps/ Time Weight/ Level Completed 25  Comments   Foam roller flexion to snow edmundo stretch 10x  x    Child's pose w/ thread the needle 5x20\" ea  x    Quadruped rotation 5x20\" ea  x Added    Doorway rhomboid stretch 3x30\"  x           Prone scapular retraction 20x5\"  x Prone performed on stability ball    Prone scap retraction + ext 20x2\" 2# DBs x    Prone T 20x2\" 2# DBs x    Prone Y 20x2\"  x           Forearm walks on wall 15x green x    Standing serratus lift 15x green x    Serratus push up plus 10x      Standing serratus push 20x Orange power band x    Primal pushup 10x5\"      Primal push up + shoulder tap 10x ea  x           90/90 ER + step into rotation with resistance 10x green x Added    High row 10x2 green x Added    Other:    Specific  WDL

## 2025-07-28 ENCOUNTER — HOSPITAL ENCOUNTER (OUTPATIENT)
Dept: PHYSICAL THERAPY | Age: 30
Setting detail: THERAPIES SERIES
Discharge: HOME | End: 2025-07-28
Payer: COMMERCIAL

## 2025-07-28 PROCEDURE — 97110 THERAPEUTIC EXERCISES: CPT

## 2025-07-28 NOTE — FLOWSHEET NOTE
[] [facility]  Outpatient Rehabilitation &  Therapy  [address]   [address]  P: [phone]  F: [phone] [x] [facility]  Outpatient Rehabilitation &  Therapy  [address] [facility]   [address]  P: [phone]  F: [phone]        Physical Therapy Daily Treatment Note    Date:  2025  Patient Name:  Duarte Varela    :  1995  MRN: [id number]  Physician: Jed Starks PA-C                                 Insurance: [employer] ( visits remaining, Auth after  visit)  Medical Diagnosis: M89.9 (ICD-10-CM) - Scapular dysfunction     Rehab Codes: M89.9, R53.1  Onset Date: 25               Next 's appt: TBD  Visit# / total visits:      Cancels/No Shows: 0/0    Subjective:    Pain:  [x] Yes  [] No Location: R scapula Pain Rating: (0-10 scale) \"tight\"/10  Pain altered Tx:  [x] No  [] Yes  Action:  Comments:  Pt arrives reporting that he is noticing improvements, he reports that he has been diligent about his exercises and feels that has helped. He notes improvement in tolerance to going to the gym.      Objective:  Modalities:   Precautions: standard  Exercises:  Exercise Reps/ Time Weight/ Level Completed 25  Comments   Foam roller flexion to snow edmundo stretch 10x  x    Child's pose w/ thread the needle 5x20\" ea  x    Quadruped rotation 5x20\" ea  x Added    Doorway rhomboid stretch 3x30\"  x           Prone scapular retraction 20x5\"  x Prone performed on stability ball    Prone scap retraction + ext 20x2\" 2# DBs x    Prone T 20x2\" 2# DBs x    Prone Y 20x2\" 2# DBs x Added weight           Forearm walks on wall 15x green x    Standing serratus lift 15x green x    Serratus push up plus 10x      Standing serratus push 20x Orange power band x    Primal pushup 10x5\"      Primal push up + shoulder tap 10x ea  x           90/90 ER + step into rotation with resistance 10x green x Added    Bilateral ER 10x2